# Patient Record
Sex: MALE | NOT HISPANIC OR LATINO | Employment: OTHER | URBAN - METROPOLITAN AREA
[De-identification: names, ages, dates, MRNs, and addresses within clinical notes are randomized per-mention and may not be internally consistent; named-entity substitution may affect disease eponyms.]

---

## 2022-05-03 ENCOUNTER — OFFICE VISIT (OUTPATIENT)
Dept: CARDIOLOGY | Facility: CLINIC | Age: 42
End: 2022-05-03

## 2022-05-03 ENCOUNTER — HOSPITAL ENCOUNTER (OUTPATIENT)
Dept: CARDIOLOGY | Facility: HOSPITAL | Age: 42
Discharge: HOME | End: 2022-05-03

## 2022-05-03 ENCOUNTER — TRANSCRIBE ORDERS (OUTPATIENT)
Dept: ADMINISTRATIVE | Facility: HOSPITAL | Age: 42
End: 2022-05-03

## 2022-05-03 ENCOUNTER — LAB (OUTPATIENT)
Dept: LAB | Facility: HOSPITAL | Age: 42
End: 2022-05-03

## 2022-05-03 VITALS
DIASTOLIC BLOOD PRESSURE: 80 MMHG | HEART RATE: 64 BPM | SYSTOLIC BLOOD PRESSURE: 142 MMHG | BODY MASS INDEX: 39.42 KG/M2 | WEIGHT: 281.6 LBS | HEIGHT: 71 IN

## 2022-05-03 DIAGNOSIS — R06.02 EXERTIONAL SHORTNESS OF BREATH: ICD-10-CM

## 2022-05-03 DIAGNOSIS — E78.00 HYPERCHOLESTEREMIA: ICD-10-CM

## 2022-05-03 DIAGNOSIS — Z13.6 ENCOUNTER FOR SCREENING FOR VASCULAR DISEASE: Primary | ICD-10-CM

## 2022-05-03 DIAGNOSIS — E66.01 MORBIDLY OBESE: ICD-10-CM

## 2022-05-03 DIAGNOSIS — I10 ESSENTIAL HYPERTENSION: ICD-10-CM

## 2022-05-03 DIAGNOSIS — R06.02 EXERTIONAL SHORTNESS OF BREATH: Primary | ICD-10-CM

## 2022-05-03 DIAGNOSIS — E78.00 HYPERCHOLESTEROLEMIA: ICD-10-CM

## 2022-05-03 PROBLEM — G47.33 OBSTRUCTIVE SLEEP APNEA: Status: ACTIVE | Noted: 2022-05-03

## 2022-05-03 LAB
BH CV STRESS BP STAGE 1: NORMAL
BH CV STRESS BP STAGE 2: NORMAL
BH CV STRESS DURATION MIN STAGE 1: 3
BH CV STRESS DURATION MIN STAGE 2: 2
BH CV STRESS DURATION SEC STAGE 1: 0
BH CV STRESS DURATION SEC STAGE 2: 0
BH CV STRESS GRADE STAGE 1: 10
BH CV STRESS GRADE STAGE 2: 12
BH CV STRESS HR STAGE 1: 138
BH CV STRESS HR STAGE 2: 158
BH CV STRESS METS STAGE 1: 5
BH CV STRESS METS STAGE 2: 6
BH CV STRESS PROTOCOL 1: NORMAL
BH CV STRESS RECOVERY BP: NORMAL MMHG
BH CV STRESS RECOVERY HR: 98 BPM
BH CV STRESS SPEED STAGE 1: 1.7
BH CV STRESS SPEED STAGE 2: 2.5
BH CV STRESS STAGE 1: 1
BH CV STRESS STAGE 2: 2
CHOLEST SERPL-MCNC: 146 MG/DL (ref 0–200)
HDLC SERPL-MCNC: 37 MG/DL (ref 40–60)
LDLC SERPL CALC-MCNC: 95 MG/DL (ref 0–100)
LDLC/HDLC SERPL: 2.58 {RATIO}
MAXIMAL PREDICTED HEART RATE: 176 BPM
PERCENT MAX PREDICTED HR: 89.77 %
STRESS BASELINE BP: NORMAL MMHG
STRESS BASELINE HR: 79 BPM
STRESS PERCENT HR: 106 %
STRESS POST ESTIMATED WORKLOAD: 6 METS
STRESS POST EXERCISE DUR MIN: 5 MIN
STRESS POST EXERCISE DUR SEC: 0 SEC
STRESS POST PEAK BP: NORMAL MMHG
STRESS POST PEAK HR: 158 BPM
STRESS TARGET HR: 150 BPM
TRIGL SERPL-MCNC: 68 MG/DL (ref 0–150)
VLDLC SERPL-MCNC: 14 MG/DL (ref 5–40)

## 2022-05-03 PROCEDURE — 93016 CV STRESS TEST SUPVJ ONLY: CPT | Performed by: INTERNAL MEDICINE

## 2022-05-03 PROCEDURE — 93000 ELECTROCARDIOGRAM COMPLETE: CPT | Performed by: INTERNAL MEDICINE

## 2022-05-03 PROCEDURE — 99204 OFFICE O/P NEW MOD 45 MIN: CPT | Performed by: INTERNAL MEDICINE

## 2022-05-03 PROCEDURE — 36415 COLL VENOUS BLD VENIPUNCTURE: CPT

## 2022-05-03 PROCEDURE — 93018 CV STRESS TEST I&R ONLY: CPT | Performed by: INTERNAL MEDICINE

## 2022-05-03 PROCEDURE — 93017 CV STRESS TEST TRACING ONLY: CPT

## 2022-05-03 PROCEDURE — 80061 LIPID PANEL: CPT

## 2022-05-03 RX ORDER — VORTIOXETINE 10 MG/1
1 TABLET, FILM COATED ORAL DAILY
COMMUNITY
Start: 2021-12-06

## 2022-05-03 RX ORDER — DILTIAZEM HYDROCHLORIDE 60 MG/1
2 TABLET, FILM COATED ORAL 2 TIMES DAILY
COMMUNITY
Start: 2022-02-18

## 2022-05-03 RX ORDER — ERGOCALCIFEROL 1.25 MG/1
CAPSULE ORAL
COMMUNITY
Start: 2022-04-28

## 2022-05-03 RX ORDER — BUSPIRONE HYDROCHLORIDE 10 MG/1
TABLET ORAL
COMMUNITY
Start: 2022-04-19

## 2022-05-03 RX ORDER — ATORVASTATIN CALCIUM 20 MG/1
20 TABLET, FILM COATED ORAL DAILY
COMMUNITY
Start: 2022-02-03

## 2022-05-03 RX ORDER — LISINOPRIL 10 MG/1
10 TABLET ORAL DAILY
COMMUNITY
Start: 2022-02-03

## 2022-05-03 NOTE — PROGRESS NOTES
PATIENTINFORMATION    Date of Office Visit: 2022  Encounter Provider: Joaquin Ugalde MD  Place of Service: [facility] CARDIOLOGY  Patient Name: Phu Field  : 1980    Subjective:     Encounter Date:2022      Patient ID: Phu Field is a 42 y.o. male.    Chief Complaint   Patient presents with   • new patient   • Shortness of Breath   • Edema     HPI  Mr. Field is a 41 yo gentleman with past medical history of hypertension, hyperlipidemia, prediabetes, obesity and SOFIYA came to cardiology clinic for evaluation of valvular heart disease he was told in the past.  He is seeing a psychiatrist for treatment of depression 1.patient cleared for recommending exercise.  He reports being told in the past(>10 yrs ago) to avoid strenuous activities because of leaky aortic valve.  He has not been active for years and reports gaining more than 100 pounds.  He works from a desk and mostly sedentary.  He reports exertional shortness of breath and dizziness.  He had to go to the ER in 2022 with presyncope and COVID infection.  Echocardiogram at [facility] was normal.  He denies any chest pain, orthopnea, PND, palpitations, presyncope or syncope or significant lower extremity swelling.  He takes lisinopril for hypertension and atorvastatin for hyperlipidemia.  His mother had heart attack in her 30s.  Denies any tobacco use, recreational drug use or alcohol abuse.      ROS  All systems reviewed and negative except as noted in HPI.    Past Medical History:   Diagnosis Date   • Asthma    • Heart valve disease    • Hypertension    • Sleep apnea        Past Surgical History:   Procedure Laterality Date   • GALLBLADDER SURGERY     • TONSILECTOMY, ADENOIDECTOMY, BILATERAL MYRINGOTOMY AND TUBES         Social History     Socioeconomic History   • Marital status:    • Number of children: 2   Tobacco Use   • Smoking status: Never Smoker   Substance and Sexual Activity   • Alcohol use:  "Never   • Drug use: Never       Family History   Problem Relation Age of Onset   • Hypertension Mother    • Heart failure Mother    • Heart disease Mother    • Heart attack Mother    • Heart disease Maternal Grandfather    • Heart failure Maternal Grandfather    • Hypertension Maternal Grandfather            ECG 12 Lead    Date/Time: 5/3/2022 9:22 AM  Performed by: Joaquin Ugalde MD  Authorized by: Joaquin Ugalde MD   Comparison: not compared with previous ECG   Rhythm: sinus rhythm  Rate: normal  Conduction: conduction normal  ST Segments: ST segments normal  T Waves: T waves normal  QRS axis: normal  Other: no other findings    Clinical impression: normal ECG               Objective:     /80 (BP Location: Left arm, Patient Position: Sitting)   Pulse 64   Ht 180.3 cm (71\")   Wt 128 kg (281 lb 9.6 oz)   BMI 39.28 kg/m²  Body mass index is 39.28 kg/m².     Constitutional:       General: Not in acute distress.     Appearance: Well-developed. Not diaphoretic.   Eyes:      Pupils: Pupils are equal, round, and reactive to light.   HENT:      Head: Normocephalic and atraumatic.   Neck:      Thyroid: No thyromegaly.   Pulmonary:      Effort: Pulmonary effort is normal. No respiratory distress.      Breath sounds: Normal breath sounds. No wheezing. No rales.   Chest:      Chest wall: Not tender to palpatation.   Cardiovascular:      Normal rate. Regular rhythm.      No gallop.   Pulses:     Intact distal pulses.   Edema:     Peripheral edema absent.   Abdominal:      General: Bowel sounds are normal. There is no distension.      Palpations: Abdomen is soft.      Tenderness: There is no guarding.   Musculoskeletal: Normal range of motion.         General: No deformity.      Cervical back: Normal range of motion and neck supple. Skin:     General: Skin is warm and dry.      Findings: No rash.   Neurological:      Mental Status: Alert and oriented to person, place, and time.      Cranial Nerves: No " cranial nerve deficit.      Deep Tendon Reflexes: Reflexes are normal and symmetric.   Psychiatric:         Judgment: Judgment normal.         Review Of Data: I have reviewed pertinent recent labs, images and documents and pertinent findings included in HPI or assessment below.          Assessment/Plan:         1.  Exertional shortness of breath and dyspnea-normal echocardiogram in January 2022 at [facility] .  Normal cardiovascular examination today.  2.  Obesity with complications including prediabetes, hyperlipidemia, hypertension and SOFIYA  3.  Hypertension on lisinopril- office blood pressure readings was 142/80  4.  Hypercholesterolemia on statin-check lipid panel  5.  Prediabetes with most recent A1c of 6.3  6.  Family history of premature coronary artery disease.    I will send patient for treadmill test   Check lipid panel  Patient interested in vascular screening   Will work on diet and exercise if treadmill is normal.      Diagnosis and plan of care discussed with patient and verbalized understanding.    Addendum: Patient was able to exercise for 5 minutes and felt dizzy.  Target heart rate achieved.  EKG negative for ischemia.  Blood pressure appropriately increased and no arrhythmia. Encouraged patient to start exercising regularly and modify diet to lose weight.   Follow up on vascular screening and lipid panel.            Your medication list          Accurate as of May 3, 2022  9:43 AM. If you have any questions, ask your nurse or doctor.            CONTINUE taking these medications      Instructions Last Dose Given Next Dose Due   atorvastatin 20 MG tablet  Commonly known as: LIPITOR      Take 20 mg by mouth Daily.       busPIRone 10 MG tablet  Commonly known as: BUSPAR      TAKE 0.5 TO 1 TABLET BY MOUTH EVERY 4 HOURS FOR ANXIETY. TAKE WITH FOOD       lisinopril 10 MG tablet  Commonly known as: PRINIVIL,ZESTRIL      Take 10 mg by mouth Daily.       Symbicort 80-4.5 MCG/ACT inhaler  Generic drug:  budesonide-formoterol      Inhale 2 puffs 2 (Two) Times a Day.       Trintellix 10 MG tablet  Generic drug: Vortioxetine HBr      Take 1 tablet by mouth Daily.       vitamin D 1.25 MG (11588 UT) capsule capsule  Commonly known as: ERGOCALCIFEROL                      Joaquin Ugalde MD  05/03/22  09:43 EDT

## 2022-05-04 NOTE — PROGRESS NOTES
Please notify patient that his cholestrol level overall good except his good cholestrole( HDL) slightly low- best treated with healthier diet, weight loss, regular exercise. Let me know if he has any questions. Thank you

## 2022-07-25 ENCOUNTER — HOSPITAL ENCOUNTER (OUTPATIENT)
Dept: CARDIOLOGY | Facility: HOSPITAL | Age: 42
Discharge: HOME | End: 2022-07-25

## 2022-07-25 ENCOUNTER — HOSPITAL ENCOUNTER (OUTPATIENT)
Dept: CT IMAGING | Facility: HOSPITAL | Age: 42
Discharge: HOME | End: 2022-07-25

## 2022-07-25 DIAGNOSIS — Z13.6 ENCOUNTER FOR SCREENING FOR VASCULAR DISEASE: ICD-10-CM

## 2022-07-25 PROCEDURE — 93799 UNLISTED CV SVC/PROCEDURE: CPT

## 2022-07-25 PROCEDURE — 75571 CT HRT W/O DYE W/CA TEST: CPT

## 2022-07-25 NOTE — PROGRESS NOTES
Please let him know that CT calcium score shows him to be low risk for cardiac disease. We recommend good blood pressure and cholesterol control.     Thanks!  BIA Yan

## 2022-07-30 LAB
BH CV XLRA MEAS - MID AO DIAM: 1.8 CM
BH CV XLRA MEAS - PAD LEFT ABI PT: 1.13
BH CV XLRA MEAS - PAD LEFT ARM: 131 MMHG
BH CV XLRA MEAS - PAD LEFT LEG PT: 151 MMHG
BH CV XLRA MEAS - PAD RIGHT ABI PT: 1.15
BH CV XLRA MEAS - PAD RIGHT ARM: 134 MMHG
BH CV XLRA MEAS - PAD RIGHT LEG PT: 154 MMHG
BH CV XLRA MEAS LEFT DIST CCA EDV: 20.5 CM/SEC
BH CV XLRA MEAS LEFT DIST CCA PSV: 70.2 CM/SEC
BH CV XLRA MEAS LEFT ICA/CCA RATIO: 1.1
BH CV XLRA MEAS LEFT MID CCA PSV: 70 CM/SEC
BH CV XLRA MEAS LEFT MID ICA PSV: 78 CM/SEC
BH CV XLRA MEAS LEFT PROX ICA EDV: -26.7 CM/SEC
BH CV XLRA MEAS LEFT PROX ICA PSV: -77.7 CM/SEC
BH CV XLRA MEAS RIGHT DIST CCA EDV: -23 CM/SEC
BH CV XLRA MEAS RIGHT DIST CCA PSV: -69.6 CM/SEC
BH CV XLRA MEAS RIGHT ICA/CCA RATIO: 1
BH CV XLRA MEAS RIGHT MID CCA PSV: 70 CM/SEC
BH CV XLRA MEAS RIGHT MID ICA PSV: 71 CM/SEC
BH CV XLRA MEAS RIGHT PROX ICA EDV: -22.4 CM/SEC
BH CV XLRA MEAS RIGHT PROX ICA PSV: -71.4 CM/SEC
MAXIMAL PREDICTED HEART RATE: 176 BPM
STRESS TARGET HR: 150 BPM

## 2022-08-01 ENCOUNTER — TELEPHONE (OUTPATIENT)
Dept: CARDIOLOGY | Facility: CLINIC | Age: 42
End: 2022-08-01

## 2022-08-01 NOTE — TELEPHONE ENCOUNTER
"Reviewed results with patient and his wife, they verbalized understanding.     Patient reports in January 2022 he had an episode of syncope and was seen in the ER, he was told it was caused by dehydration and did not mention it to Dr. Ugalde at his LOV in May. Patient states one month ago he had another syncopal episode after getting out of bed and feeling dizzy. His wife checked his BP and it was \"extremely low.\" He was seen by his PCP who referred him to neurology and to follow up with cardiology. Patient is asking if since his vascular screening and calcium score were normal if he needs a follow up appointment. Please advise and I will call them back.     Thank you,   Jannie Garcia RN  [facility] Triage  08/01/22  11:32 EDT  "

## 2022-08-01 NOTE — TELEPHONE ENCOUNTER
----- Message from BIA Pedraza sent at 8/1/2022 10:55 AM EDT -----  I am covering Dr. Ugalde's in basket today because he is out of the office.    Please call patient.  All vascular screenings were normal.  Thank you.

## 2022-08-12 ENCOUNTER — OFFICE VISIT (OUTPATIENT)
Dept: CARDIOLOGY | Facility: CLINIC | Age: 42
End: 2022-08-12

## 2022-08-12 VITALS
BODY MASS INDEX: 38.58 KG/M2 | SYSTOLIC BLOOD PRESSURE: 94 MMHG | DIASTOLIC BLOOD PRESSURE: 66 MMHG | HEART RATE: 64 BPM | WEIGHT: 276.6 LBS

## 2022-08-12 DIAGNOSIS — R55 SYNCOPE AND COLLAPSE: Primary | ICD-10-CM

## 2022-08-12 PROCEDURE — 93000 ELECTROCARDIOGRAM COMPLETE: CPT | Performed by: INTERNAL MEDICINE

## 2022-08-12 PROCEDURE — 99213 OFFICE O/P EST LOW 20 MIN: CPT | Performed by: INTERNAL MEDICINE

## 2022-08-12 NOTE — PROGRESS NOTES
PATIENTINFORMATION    Date of Office Visit: 2022  Encounter Provider: Joaquin gUalde MD  Place of Service: [facility] CARDIOLOGY  Patient Name: Phu Field  : 1980    Subjective:     Encounter Date:2022      Patient ID: Phu Field is a 42 y.o. male.    Chief Complaint   Patient presents with   • Loss of Consciousness     HPI  Mr. Field came to cardiac clinic for follow-up visit.  He had 2 episodes of syncope 1 in 2022 and another one about 2 months ago after I saw him in clinic.  He feels lightheaded before passing out and his systolic blood pressure drops to the 70s.  He is still taking lisinopril 10 mg p.o. daily and his blood pressure today in office was 94/66 mmHg.  He denies any significant chest pain, shortness of breath, orthopnea, PND, palpitations or extremity swelling.  He has increased level of activity and modified his diet and has lost about 5 pounds in the last few months.    ROS  All systems reviewed and negative except as noted in HPI.    Past Medical History:   Diagnosis Date   • Asthma    • Heart valve disease    • Hypertension    • Sleep apnea        Past Surgical History:   Procedure Laterality Date   • GALLBLADDER SURGERY     • TONSILECTOMY, ADENOIDECTOMY, BILATERAL MYRINGOTOMY AND TUBES         Social History     Socioeconomic History   • Marital status:    • Number of children: 2   Tobacco Use   • Smoking status: Never Smoker   • Smokeless tobacco: Never Used   Vaping Use   • Vaping Use: Never used   Substance and Sexual Activity   • Alcohol use: Never   • Drug use: Never   • Sexual activity: Defer       Family History   Problem Relation Age of Onset   • Hypertension Mother    • Heart failure Mother    • Heart disease Mother    • Heart attack Mother    • Heart disease Maternal Grandfather    • Heart failure Maternal Grandfather    • Hypertension Maternal Grandfather            ECG 12 Lead    Date/Time: 2022 4:22 PM  Performed  by: Joaquin Ugalde MD  Authorized by: Joaquin Ugalde MD   Comparison: compared with previous ECG from 5/3/2022  Similar to previous ECG  Rhythm: sinus rhythm  Rate: normal  Conduction: conduction normal  ST Segments: ST segments normal  T Waves: T waves normal  QRS axis: normal  Other: no other findings    Clinical impression: normal ECG               Objective:     BP 94/66 (BP Location: Left arm)   Pulse 64   Wt 125 kg (276 lb 9.6 oz)   BMI 38.58 kg/m²  Body mass index is 38.58 kg/m².     Constitutional:       General: Not in acute distress.     Appearance: Well-developed. Not diaphoretic.   Eyes:      Pupils: Pupils are equal, round, and reactive to light.   HENT:      Head: Normocephalic and atraumatic.   Neck:      Thyroid: No thyromegaly.   Pulmonary:      Effort: Pulmonary effort is normal. No respiratory distress.      Breath sounds: Normal breath sounds. No wheezing. No rales.   Chest:      Chest wall: Not tender to palpatation.   Cardiovascular:      Normal rate. Regular rhythm.      No gallop.   Pulses:     Intact distal pulses.   Edema:     Peripheral edema absent.   Abdominal:      General: Bowel sounds are normal. There is no distension.      Palpations: Abdomen is soft.      Tenderness: There is no guarding.   Musculoskeletal: Normal range of motion.         General: No deformity.      Cervical back: Normal range of motion and neck supple. Skin:     General: Skin is warm and dry.      Findings: No rash.   Neurological:      Mental Status: Alert and oriented to person, place, and time.      Cranial Nerves: No cranial nerve deficit.      Deep Tendon Reflexes: Reflexes are normal and symmetric.   Psychiatric:         Judgment: Judgment normal.         Review Of Data: I have reviewed pertinent recent labs, images and documents and pertinent findings included in HPI or assessment below.    Lipid Panel    Lipid Panel 5/3/22   Total Cholesterol 146   Triglycerides 68   HDL Cholesterol 37 (A)    VLDL Cholesterol 14   LDL Cholesterol  95   LDL/HDL Ratio 2.58   (A) Abnormal value                Assessment/Plan:         1.  Exertional shortness of breath and dyspnea-normal echocardiogram in January 2022 at [facility]   -5-minute treadmill test was unremarkable in May 2022.  2.  Obesity with complications including prediabetes, hyperlipidemia, hypertension and SOFIYA  -He is walking regularly and modifying his diet to lose weight  3.  Hypertension -with intermittent hypotension resulting in syncope-see plan below  4.  Hypercholesterolemia on statin.  5.  Prediabetes with most recent A1c of 6.3  6.  Family history of premature coronary artery disease.  7. Syncope from hypotension-lisinopril and likely dehydration from warm weather may be contributing.  -He will hydrate himself adequately  -Hold lisinopril and he will keep blood pressure logs at home and restart lisinopril at half of current dose with persistently elevated blood pressure of 140/90 mmHg.  He can follow-up with Dr. Isaacs    He had vascular screening for carotid, abdominal aorta and peripheral artery disease and normal.  No further testing needed from cardiac standpoint.  Follow-up with his PCP  Follow-up with cardiology as needed.          Diagnosis and plan of care discussed with patient and verbalized understanding.            Your medication list          Accurate as of August 12, 2022  4:25 PM. If you have any questions, ask your nurse or doctor.            CONTINUE taking these medications      Instructions Last Dose Given Next Dose Due   atorvastatin 20 MG tablet  Commonly known as: LIPITOR      Take 20 mg by mouth Daily.       busPIRone 10 MG tablet  Commonly known as: BUSPAR      TAKE 0.5 TO 1 TABLET BY MOUTH EVERY 4 HOURS FOR ANXIETY. TAKE WITH FOOD       cyanocobalamin 1000 MCG tablet  Commonly known as: VITAMIN B-12      Take 1,000 mcg by mouth Daily.       lisinopril 10 MG tablet  Commonly known as: PRINIVIL,ZESTRIL      Take 10 mg by  mouth Daily.       Symbicort 80-4.5 MCG/ACT inhaler  Generic drug: budesonide-formoterol      Inhale 2 puffs 2 (Two) Times a Day.       Trintellix 10 MG tablet  Generic drug: Vortioxetine HBr      Take 1 tablet by mouth Daily.       vitamin D 1.25 MG (67151 UT) capsule capsule  Commonly known as: ERGOCALCIFEROL                      Joaquin Ugalde MD  08/12/22  16:25 EDT     Hospital chart/[facility]